# Patient Record
Sex: FEMALE | Race: BLACK OR AFRICAN AMERICAN | Employment: OTHER | URBAN - METROPOLITAN AREA
[De-identification: names, ages, dates, MRNs, and addresses within clinical notes are randomized per-mention and may not be internally consistent; named-entity substitution may affect disease eponyms.]

---

## 2017-03-09 NOTE — PATIENT DISCUSSION
Explained the risks and benefits from having retinal detachment repair in his right eye. Scheduling is currently underway.

## 2017-03-09 NOTE — PATIENT DISCUSSION
Greater than 50% of encounter spent in dialogue. Mr. Singhcy Nemo would like to proceed with the surgery.

## 2017-07-24 NOTE — PATIENT DISCUSSION
Retina is attached 360 degrees and I have recommended surgery to remove the silicone oil from the right eye. All risks and benefits discussed with Mr. Anne Silvestre and he would like to schedule in the near future.

## 2017-10-02 NOTE — PATIENT DISCUSSION
*CATARACT, OS - BECOMING VISUALLY SIGNIFICANT BUT NOT BOTHERSOME TO PATIENT AT THIS TIME. SPEC RX OFFERED. FOLLOW AS SCHEDULED.

## 2017-10-02 NOTE — PATIENT DISCUSSION
Epiretinal Membrane Counseling: The diagnosis and natural history of epiretinal membrane was discussed with the patient including the risk of progression with retinal traction resulting in visual distortion. Patient instructed on how to do [address] to check for distortion in vision, The patient is instructed to call back immediately if any vision changes, and keep follow up as scheduled.

## 2017-10-02 NOTE — PATIENT DISCUSSION
POSTERIOR CAPSULAR FIBROSIS, OD:  VISUALLY SIGNIFICANT. SCHEDULE YAG CAPSULOTOMY OD. DISCUSSED INCREASE RISK OF REPEAT RETINAL DETACHMENT IN OD WITH YAG LASER. MUST GET CLEARANCE FROM RETINAL SPECIALIST, DR. FARLEY BEFORE PROCEEDING WITH YAG. NO GUARANTEE THAT THE YAG LASER WILL MAKE A NOTICEABLE IMPROVEMENT IN VISION. SCHEDULE TO SEE DR. FARLEY FOR 1 MONTH POST OP YAG.

## 2017-10-26 NOTE — PATIENT DISCUSSION
S/P YAG OD: DOING WELL. BCVA LIMITED DUE TO RETINA. RECOMMEND FULL TIME GLASSES WEAR FOR PROTECTION OF LEFT EYE. PT NOTES QUALITATIVE VA IMPROVEMENT OD WITH RX CHANGE DESPITE NO CHANGE IN BCVA. NEW SPEC RX PROVIDED. FOLLOW AS SCHEDULED.

## 2018-10-04 NOTE — PATIENT DISCUSSION
CYSTOID MACULAR EDEMA, OD:  PRESCRIBE ILEVRO QAM OD AND REFER TO DR. FARLEY FOR EVALUATION. RETURN FOR FOLLOW-UP AS SCHEDULED.

## 2018-10-04 NOTE — PATIENT DISCUSSION
EPIRETINAL MEMBRANE, OD.  VISUALLY SIGNIFICANT. REFER TO RETINA SPECIALIST FOR EVALUATION AND TREATMENT.

## 2018-10-04 NOTE — PATIENT DISCUSSION
New Prescription: Ilevro (nepafenac): drops,suspension: 0.3% 1 drop every morning as directed into right eye 10-

## 2018-10-04 NOTE — PATIENT DISCUSSION
Epiretinal Membrane Counseling: The diagnosis and natural history of epiretinal membrane was discussed with the patient including the risk of progression with retinal traction resulting in visual distortion. Patient instructed on how to do [address] to check for distortion in vision, The patient is instructed to call back immediately if any vision changes, and keep follow up as scheduled.

## 2018-10-17 NOTE — PATIENT DISCUSSION
Continue: Ilevro (nepafenac): drops,suspension: 0.3% 1 drop every morning as directed into right eye 10-

## 2019-04-04 NOTE — PATIENT DISCUSSION
DERMATOCHALASIS / PTOSIS RUL AND MARTÍN :  VISUALLY SIGNIFICANT TO PATIENT. SCHEDULE WITH OCULOPLASTIC SPECIALIST IF PATIENT DESIRES.

## 2019-04-04 NOTE — PATIENT DISCUSSION
Epiretinal Membrane Counseling: The diagnosis and natural history of epiretinal membrane was discussed with the patient including the risk of progression with retinal traction resulting in visual distortion. Patient instructed on how to do [address] to check for distortion in vision, The patient is instructed to call back immediately if any vision changes, and keep follow up as scheduled.

## 2019-04-04 NOTE — PATIENT DISCUSSION
*Cataract Counseling: The patient's cataracts are slowly becoming visually significant. Not time to proceed with surgery now. I have discussed continuing with current spectacles versus updating. I have offered the patient a new spectacle prescription to fill if desires. Return to follow up as scheduled or sooner if symptoms arise.

## 2019-04-04 NOTE — PATIENT DISCUSSION
Epiphora Associated with Dry Eye Syndrome Counseling: I have discussed the diagnosis and the pathophysiology of this disease with the patient. Eyelid pathology and systemic illnesses such as Sjogren?s disease or rheumatoid arthritis may contribute to severity. Vision may be limited by dry eye, and symptoms exacerbated by environmental factors such as smoke, wind, or prolonged eye use. Treatment options include, but are not limited to, artificial tears, punctal plugs, topical cyclosporine, oral omega-3 supplements, Lipiflow, moisture goggles, and lubricating ointments. I stressed the importance of compliance with treatment.

## 2019-04-04 NOTE — PATIENT DISCUSSION
*CATARACT, OS - SLOWLY BECOMING VISUALLY SIGNIFICANT BUT NOT BOTHERSOME TO PATIENT AT THIS TIME. SPEC RX OFFERED. FOLLOW AS SCHEDULED.

## 2019-04-04 NOTE — PATIENT DISCUSSION
EPIRETINAL MEMBRANE, OD.  VISUALLY SIGNIFICANT. RETURN  TO RETINA SPECIALIST FOR EVALUATION AND TREATMENT.

## 2019-04-04 NOTE — PATIENT DISCUSSION
Stopped Today: Ilevro (nepafenac): drops,suspension: 0.3% 1 drop every morning as directed into right eye 10-

## 2019-04-04 NOTE — PATIENT DISCUSSION
Monocular Precautions for Adults. There are a number of recommendations for eye injury prevention for work, home and recreation activities. The most important recommendation is for 100% full-time use of protective eye wear for monocular patients. Polycarbonate or shatter proof lenses are highly recommended. It is also known that eye injuries are more common when performing unfamiliar tasks or during periods of fatigue.  Patient understands to call with any ocular or socket pain and continue with yearly eye exams

## 2019-10-02 NOTE — PATIENT DISCUSSION
Post-laser (VISHNU) Counseling:  Treatment of retinal breaks with laser greatly reduces but does not completely eliminate the risk of retinal detachment. Keep any scheduled appointments for re-evaluation of the retina, and report any increase in flashing lights or floaters.

## 2019-10-02 NOTE — PATIENT DISCUSSION
RETINAL DETACHMENT, OD: S/P SCLERAL BUCKLE, PPV, OIL REMOVAL OD; SMALL RD (1 CLOCK HOUR) ON EXAM TODAY. RECOMMEND LASER RETINOPEXY (VISHNU).

## 2019-10-18 NOTE — PATIENT DISCUSSION
TOTAL RETINAL DETACHMENT, OD: DISCUSSED GUARDED VISUAL PROGNOSIS DUE TO HISTORY OF MACULAR ATROPHY WITH COUNTING FINGERS VISION AFTER RETINA WAS REATTACHED. RECOMMEND PPV WITH GAS OR SILICONE OIL. PATIENT WOULD LIKE TO RETURN TO DR Tamar Julien FOR 2ND OPINION BEFORE DECIDING ON SURGERY.

## 2019-10-18 NOTE — PATIENT DISCUSSION
PATIENT WILL CALL TO SCHEDULE SURGERY WITH DR Huyen Humphreys, IF HE CANNOT SCHEDULE SURGERY WITH DR Jaclyn Bennett.

## 2019-10-22 NOTE — PATIENT DISCUSSION
I have explained to Mr. Homero Castellanos that I do not feel that surgery is needed at this point. I would consider putting the silicone oil back in if the eye pressure gets low or the eye begins to shrink. After a long discussion with Mr. Homero Castellanos and his wife, we decided on observation. I will see him again in 3 months, or sooner, if he has any problems or concerns before then.

## 2020-01-23 NOTE — PATIENT DISCUSSION
I have explained to Mr. Isael Nichols that I do not feel that surgery is needed at this point. I would consider putting the silicone oil back in if the eye pressure gets low or the eye begins to shrink. After a long discussion with Mr. Isael Nichols and his wife, we decided on observation. I will see him again in 4 months, or sooner, if he has any problems or concerns before then.

## 2020-05-21 NOTE — PATIENT DISCUSSION
I have recommended that we continue to monitor. I will see him in 6 months or sooner, if he has any problems or concerns.

## 2020-06-25 NOTE — PATIENT DISCUSSION
*CATARACTS, OS - SLOWLY BECOMING VISUALLY SIGNIFICANT BUT NOT BOTHERSOME TO PATIENT AT THIS TIME. SPEC RX OFFERED. FOLLOW AS SCHEDULED.

## 2020-06-25 NOTE — PATIENT DISCUSSION
POSTERIOR VITREOUS DETACHMENT WITH VITREOUS FLOATERS, OS: NO HOLES OR NO TEARS 360' WITH INDIRECT EXAM, OS. F&amp;F PRECAUTIONS REVIEWED WITH THE PATIENT. RETURN  AS SCHEDULED.

## 2020-06-25 NOTE — PATIENT DISCUSSION
EPIRETINAL MEMBRANE, OU: MINIMAL VISUAL SIGNIFICANCE TO THE PATIENT AT THIS TIME. FOLLOW WITH DR. FARLEY AS SCHEDULED.

## 2020-06-25 NOTE — PATIENT DISCUSSION
Continue: PreserVision AREDS 2 (vit c,o-yd-akgly-lutein-zeaxan): capsule: [id number] mg-unit-mg-mg 1 capsule twice a day as directed by mouth

## 2020-11-10 NOTE — PATIENT DISCUSSION
I have recommended that  He Bustos see me on an as needed basis and continue Comprehensive care with Dr. Merry Patel.

## 2021-09-02 ENCOUNTER — NEW PATIENT COMPREHENSIVE (OUTPATIENT)
Dept: URBAN - METROPOLITAN AREA CLINIC 46 | Facility: CLINIC | Age: 56
End: 2021-09-02

## 2021-09-02 DIAGNOSIS — H40.1122: ICD-10-CM

## 2021-09-02 DIAGNOSIS — H40.021: ICD-10-CM

## 2021-09-02 DIAGNOSIS — H04.123: ICD-10-CM

## 2021-09-02 DIAGNOSIS — H52.7: ICD-10-CM

## 2021-09-02 PROCEDURE — 92015 DETERMINE REFRACTIVE STATE: CPT

## 2021-09-02 PROCEDURE — 92004 COMPRE OPH EXAM NEW PT 1/>: CPT

## 2021-09-02 ASSESSMENT — TONOMETRY
OD_IOP_MMHG: 20
OS_IOP_MMHG: 19

## 2021-09-02 ASSESSMENT — VISUAL ACUITY
OD_SC: J8
OS_SC: J8
OU_SC: J6
OD_SC: 20/25+2
OD_CC: J2
OU_SC: 20/25+3
OS_SC: 20/25
OU_CC: J1
OS_CC: J2

## 2021-09-22 NOTE — PATIENT DISCUSSION
CHRONIC TOTAL RETINAL DETACHMENT, OD:  CHRONIC NLP VISION AND OCULAR HYPOTONY. NO EYE PAIN. NO TREATMENT RECOMMENDED AT THIS TIME. CONTINUE TO OBSERVE.

## 2021-09-22 NOTE — PATIENT DISCUSSION
Continue: PreserVision AREDS 2 (vit c,u-iw-qlqfz-lutein-zeaxan): capsule: [id number] mg-unit-mg-mg 1 capsule twice a day as directed by mouth

## 2021-12-06 ENCOUNTER — FOLLOW UP (OUTPATIENT)
Dept: URBAN - METROPOLITAN AREA CLINIC 46 | Facility: CLINIC | Age: 56
End: 2021-12-06

## 2021-12-06 DIAGNOSIS — H04.123: ICD-10-CM

## 2021-12-06 DIAGNOSIS — H40.1112: ICD-10-CM

## 2021-12-06 DIAGNOSIS — Z01.00: ICD-10-CM

## 2021-12-06 DIAGNOSIS — H40.1122: ICD-10-CM

## 2021-12-06 DIAGNOSIS — H40.021: ICD-10-CM

## 2021-12-06 PROCEDURE — 92012 INTRM OPH EXAM EST PATIENT: CPT

## 2021-12-06 PROCEDURE — 92083 EXTENDED VISUAL FIELD XM: CPT

## 2021-12-06 ASSESSMENT — TONOMETRY
OS_IOP_MMHG: 19
OD_IOP_MMHG: 19

## 2021-12-06 ASSESSMENT — VISUAL ACUITY
OS_SC: 20/20-1
OD_SC: 20/20-1

## 2022-03-04 ENCOUNTER — CONSULTATION/EVALUATION (OUTPATIENT)
Dept: URBAN - METROPOLITAN AREA CLINIC 36 | Facility: CLINIC | Age: 57
End: 2022-03-04

## 2022-03-04 DIAGNOSIS — H40.021: ICD-10-CM

## 2022-03-04 DIAGNOSIS — Z01.00: ICD-10-CM

## 2022-03-04 DIAGNOSIS — H40.1122: ICD-10-CM

## 2022-03-04 DIAGNOSIS — H40.1112: ICD-10-CM

## 2022-03-04 PROCEDURE — 92014 COMPRE OPH EXAM EST PT 1/>: CPT

## 2022-03-04 PROCEDURE — 92250 FUNDUS PHOTOGRAPHY W/I&R: CPT

## 2022-03-04 RX ORDER — PREDNISOLONE ACETATE 10 MG/ML: 1 SUSPENSION/ DROPS OPHTHALMIC

## 2022-03-04 ASSESSMENT — VISUAL ACUITY
OS_CC: J1
OS_SC: J8
OS_SC: 20/25-1
OD_SC: 20/25-2
OD_CC: J1
OD_SC: J8

## 2022-03-04 ASSESSMENT — TONOMETRY
OS_IOP_MMHG: 30
OD_IOP_MMHG: 28

## 2022-03-21 ENCOUNTER — SURGERY/PROCEDURE (OUTPATIENT)
Dept: URBAN - METROPOLITAN AREA SURGERY 14 | Facility: SURGERY | Age: 57
End: 2022-03-21

## 2022-03-21 ENCOUNTER — PRE-OP/H&P (OUTPATIENT)
Dept: URBAN - METROPOLITAN AREA SURGERY 14 | Facility: SURGERY | Age: 57
End: 2022-03-21

## 2022-03-21 DIAGNOSIS — H40.1112: ICD-10-CM

## 2022-03-21 DIAGNOSIS — H40.033: ICD-10-CM

## 2022-03-21 DIAGNOSIS — Z01.00: ICD-10-CM

## 2022-03-21 DIAGNOSIS — H40.1122: ICD-10-CM

## 2022-03-21 DIAGNOSIS — H40.20X2: ICD-10-CM

## 2022-03-21 DIAGNOSIS — H40.021: ICD-10-CM

## 2022-03-21 PROCEDURE — 6676150 BILATERAL LASER IRIDOTOMY

## 2022-03-21 PROCEDURE — 99211T TECH SERVICE

## 2022-04-01 ENCOUNTER — FOLLOW UP (OUTPATIENT)
Dept: URBAN - METROPOLITAN AREA CLINIC 46 | Facility: CLINIC | Age: 57
End: 2022-04-01

## 2022-04-01 DIAGNOSIS — H40.1112: ICD-10-CM

## 2022-04-01 DIAGNOSIS — H40.20X3: ICD-10-CM

## 2022-04-01 DIAGNOSIS — H40.1122: ICD-10-CM

## 2022-04-01 DIAGNOSIS — H40.033: ICD-10-CM

## 2022-04-01 DIAGNOSIS — H40.20X2: ICD-10-CM

## 2022-04-01 PROCEDURE — 92012 INTRM OPH EXAM EST PATIENT: CPT

## 2022-04-01 ASSESSMENT — VISUAL ACUITY
OD_SC: 20/25-1
OS_SC: 20/30+1

## 2022-04-01 ASSESSMENT — TONOMETRY
OS_IOP_MMHG: 20
OD_IOP_MMHG: 19

## 2022-05-13 ENCOUNTER — FOLLOW UP (OUTPATIENT)
Dept: URBAN - METROPOLITAN AREA CLINIC 46 | Facility: CLINIC | Age: 57
End: 2022-05-13

## 2022-05-13 DIAGNOSIS — H40.033: ICD-10-CM

## 2022-05-13 DIAGNOSIS — H40.20X3: ICD-10-CM

## 2022-05-13 DIAGNOSIS — H04.123: ICD-10-CM

## 2022-05-13 DIAGNOSIS — H40.20X2: ICD-10-CM

## 2022-05-13 PROCEDURE — 92020 GONIOSCOPY: CPT

## 2022-05-13 PROCEDURE — 92012 INTRM OPH EXAM EST PATIENT: CPT

## 2022-05-13 ASSESSMENT — VISUAL ACUITY
OS_SC: 20/20-1
OD_SC: 20/25-2

## 2022-05-13 ASSESSMENT — TONOMETRY
OS_IOP_MMHG: 20
OD_IOP_MMHG: 19

## 2022-10-12 ENCOUNTER — FOLLOW UP (OUTPATIENT)
Dept: URBAN - METROPOLITAN AREA CLINIC 43 | Facility: CLINIC | Age: 57
End: 2022-10-12

## 2022-10-12 DIAGNOSIS — H40.20X2: ICD-10-CM

## 2022-10-12 DIAGNOSIS — H40.20X3: ICD-10-CM

## 2022-10-12 DIAGNOSIS — H40.021: ICD-10-CM

## 2022-10-12 DIAGNOSIS — H40.1112: ICD-10-CM

## 2022-10-12 DIAGNOSIS — H40.1122: ICD-10-CM

## 2022-10-12 DIAGNOSIS — H40.033: ICD-10-CM

## 2022-10-12 DIAGNOSIS — Z01.00: ICD-10-CM

## 2022-10-12 PROCEDURE — 92133 CPTRZD OPH DX IMG PST SGM ON: CPT

## 2022-10-12 PROCEDURE — 92012 INTRM OPH EXAM EST PATIENT: CPT

## 2022-10-12 ASSESSMENT — TONOMETRY
OD_IOP_MMHG: 20
OS_IOP_MMHG: 21

## 2022-10-12 ASSESSMENT — VISUAL ACUITY
OU_SC: 20/25
OD_SC: 20/25-1
OS_SC: 20/25-3

## 2022-11-17 ENCOUNTER — FOLLOW UP (OUTPATIENT)
Dept: URBAN - METROPOLITAN AREA CLINIC 46 | Facility: CLINIC | Age: 57
End: 2022-11-17

## 2022-11-17 DIAGNOSIS — H40.021: ICD-10-CM

## 2022-11-17 DIAGNOSIS — H40.20X3: ICD-10-CM

## 2022-11-17 DIAGNOSIS — H40.1112: ICD-10-CM

## 2022-11-17 DIAGNOSIS — H40.033: ICD-10-CM

## 2022-11-17 DIAGNOSIS — H40.1122: ICD-10-CM

## 2022-11-17 DIAGNOSIS — Z01.00: ICD-10-CM

## 2022-11-17 DIAGNOSIS — H40.20X2: ICD-10-CM

## 2022-11-17 PROCEDURE — 92012 INTRM OPH EXAM EST PATIENT: CPT

## 2022-11-17 ASSESSMENT — VISUAL ACUITY
OS_SC: 20/40-1
OD_PH: 20/25
OS_PH: 20/25
OD_SC: 20/30

## 2022-11-17 ASSESSMENT — TONOMETRY
OS_IOP_MMHG: 14
OD_IOP_MMHG: 14

## 2023-05-25 ENCOUNTER — FOLLOW UP (OUTPATIENT)
Dept: URBAN - METROPOLITAN AREA CLINIC 46 | Facility: CLINIC | Age: 58
End: 2023-05-25

## 2023-05-25 DIAGNOSIS — H40.20X3: ICD-10-CM

## 2023-05-25 DIAGNOSIS — H25.813: ICD-10-CM

## 2023-05-25 DIAGNOSIS — H40.033: ICD-10-CM

## 2023-05-25 DIAGNOSIS — H04.123: ICD-10-CM

## 2023-05-25 DIAGNOSIS — H40.1112: ICD-10-CM

## 2023-05-25 DIAGNOSIS — H40.20X2: ICD-10-CM

## 2023-05-25 DIAGNOSIS — H40.1122: ICD-10-CM

## 2023-05-25 DIAGNOSIS — Z01.00: ICD-10-CM

## 2023-05-25 DIAGNOSIS — H40.021: ICD-10-CM

## 2023-05-25 PROCEDURE — 92133 CPTRZD OPH DX IMG PST SGM ON: CPT

## 2023-05-25 PROCEDURE — 92012 INTRM OPH EXAM EST PATIENT: CPT

## 2023-05-25 PROCEDURE — 92083 EXTENDED VISUAL FIELD XM: CPT

## 2023-05-25 ASSESSMENT — TONOMETRY
OD_IOP_MMHG: 12
OS_IOP_MMHG: 14

## 2023-05-25 ASSESSMENT — VISUAL ACUITY
OD_SC: 20/20
OS_SC: 20/30
OU_SC: 20/20

## 2023-12-04 ENCOUNTER — COMPREHENSIVE EXAM (OUTPATIENT)
Dept: URBAN - METROPOLITAN AREA CLINIC 46 | Facility: CLINIC | Age: 58
End: 2023-12-04

## 2023-12-04 DIAGNOSIS — H40.20X2: ICD-10-CM

## 2023-12-04 DIAGNOSIS — H40.20X3: ICD-10-CM

## 2023-12-04 DIAGNOSIS — H25.813: ICD-10-CM

## 2023-12-04 DIAGNOSIS — H40.033: ICD-10-CM

## 2023-12-04 DIAGNOSIS — H04.123: ICD-10-CM

## 2023-12-04 PROCEDURE — 99214 OFFICE O/P EST MOD 30 MIN: CPT

## 2023-12-04 ASSESSMENT — VISUAL ACUITY
OU_SC: 20/20
OD_SC: J12
OU_CC: J2
OD_SC: 20/20-1
OS_SC: 20/25-2
OU_SC: J10
OD_CC: J2
OS_CC: J2
OS_SC: J12

## 2023-12-04 ASSESSMENT — TONOMETRY
OS_IOP_MMHG: 12
OD_IOP_MMHG: 12

## 2024-07-26 ENCOUNTER — FOLLOW UP (OUTPATIENT)
Dept: URBAN - METROPOLITAN AREA CLINIC 46 | Facility: CLINIC | Age: 59
End: 2024-07-26

## 2024-07-26 DIAGNOSIS — H40.20X3: ICD-10-CM

## 2024-07-26 DIAGNOSIS — H40.20X2: ICD-10-CM

## 2024-07-26 DIAGNOSIS — H40.033: ICD-10-CM

## 2024-07-26 PROCEDURE — 92012 INTRM OPH EXAM EST PATIENT: CPT

## 2024-07-26 PROCEDURE — 76514 ECHO EXAM OF EYE THICKNESS: CPT

## 2024-07-26 PROCEDURE — 92083 EXTENDED VISUAL FIELD XM: CPT

## 2024-07-26 PROCEDURE — 92133 CPTRZD OPH DX IMG PST SGM ON: CPT

## 2024-07-26 ASSESSMENT — PACHYMETRY
OS_CT_UM: 506
OD_CT_UM: 504

## 2024-07-26 ASSESSMENT — TONOMETRY
OD_IOP_MMHG: 11
OS_IOP_MMHG: 12
OD_IOP_MMHG: 11
OS_IOP_MMHG: 15

## 2024-07-26 ASSESSMENT — VISUAL ACUITY
OD_SC: 20/40
OS_SC: 20/50-1
OU_SC: 20/40
